# Patient Record
Sex: MALE | Race: WHITE | URBAN - METROPOLITAN AREA
[De-identification: names, ages, dates, MRNs, and addresses within clinical notes are randomized per-mention and may not be internally consistent; named-entity substitution may affect disease eponyms.]

---

## 2021-04-20 ENCOUNTER — NEW PATIENT (OUTPATIENT)
Dept: URBAN - METROPOLITAN AREA CLINIC 37 | Facility: CLINIC | Age: 41
End: 2021-04-20

## 2021-04-20 ASSESSMENT — VISUAL ACUITY
OS: 20/20
OD: 20/20

## 2021-04-20 ASSESSMENT — INTRAOCULAR PRESSURE
OD: 17
OS: 17

## 2021-04-20 ASSESSMENT — KERATOMETRY
OS: 43.10
OD: 42.65

## 2021-05-07 ENCOUNTER — Encounter (OUTPATIENT)
Dept: URBAN - METROPOLITAN AREA CLINIC 37 | Facility: CLINIC | Age: 41
End: 2021-05-07

## 2021-05-07 PROCEDURE — LASIK LASIK PRKSURGEON'S FEE: CUSTOM | Performed by: OPHTHALMOLOGY

## 2021-05-07 PROCEDURE — LASIK LASIK SURGEON'S FEE: CUSTOM | Performed by: OPHTHALMOLOGY

## 2021-05-08 ENCOUNTER — Encounter (OUTPATIENT)
Dept: URBAN - METROPOLITAN AREA CLINIC 10 | Facility: CLINIC | Age: 41
End: 2021-05-08

## 2021-05-08 DIAGNOSIS — H52.13 MYOPIA, BILATERAL: Primary | ICD-10-CM

## 2021-05-08 PROCEDURE — 99024 POSTOP FOLLOW-UP VISIT: CPT | Performed by: OPTOMETRIST

## 2021-05-25 ENCOUNTER — POST-OPERATIVE VISIT (OUTPATIENT)
Dept: URBAN - METROPOLITAN AREA CLINIC 26 | Facility: CLINIC | Age: 41
End: 2021-05-25
Payer: COMMERCIAL

## 2021-05-25 DIAGNOSIS — Z48.810 ENCOUNTER FOR SURGICAL AFTERCARE FOLLOWING SURGERY ON A SENSE ORGAN: Primary | ICD-10-CM

## 2021-05-25 PROCEDURE — 99024 POSTOP FOLLOW-UP VISIT: CPT | Performed by: OPTOMETRIST

## 2021-05-25 ASSESSMENT — INTRAOCULAR PRESSURE
OD: 17
OS: 12

## 2021-05-25 ASSESSMENT — VISUAL ACUITY
OD: 20/20
OS: 20/20

## 2021-05-25 NOTE — IMPRESSION/PLAN
Impression: S/P  Wavelight w/ Intralase OU - 18 Days. Encounter for surgical aftercare following surgery on a sense organ  [id number]. Plan: pt doing great. happy with vision distance and near OU. normal dryness OU. at's 4-6x/day OU. rtc 3 month post op f/u.

## 2021-08-26 ENCOUNTER — POST-OPERATIVE VISIT (OUTPATIENT)
Dept: URBAN - METROPOLITAN AREA CLINIC 26 | Facility: CLINIC | Age: 41
End: 2021-08-26
Payer: COMMERCIAL

## 2021-08-26 PROCEDURE — 99024 POSTOP FOLLOW-UP VISIT: CPT | Performed by: OPTOMETRIST

## 2021-08-26 ASSESSMENT — INTRAOCULAR PRESSURE
OD: 10
OS: 11

## 2021-08-26 ASSESSMENT — VISUAL ACUITY
OS: 20/15
OD: 20/15

## 2021-08-26 ASSESSMENT — KERATOMETRY
OD: 40.75
OS: 42.25

## 2021-08-26 NOTE — IMPRESSION/PLAN
Impression: S/P  Wavelight w/ Intralase OU - 111 Days. Encounter for surgical aftercare following surgery on a sense organ  [id number]. Plan: pt doing great. happy with vision OU. recommend yearly comprehensive exams. rtc prn.